# Patient Record
Sex: FEMALE | Race: WHITE | NOT HISPANIC OR LATINO | Employment: FULL TIME | URBAN - METROPOLITAN AREA
[De-identification: names, ages, dates, MRNs, and addresses within clinical notes are randomized per-mention and may not be internally consistent; named-entity substitution may affect disease eponyms.]

---

## 2017-09-26 ENCOUNTER — HOSPITAL ENCOUNTER (OUTPATIENT)
Dept: ONCOLOGY | Facility: HOSPITAL | Age: 27
End: 2017-09-27
Attending: SURGERY | Admitting: SURGERY

## 2017-09-26 LAB
ABS NEUT (OLG): 5.29 X10(3)/MCL (ref 2.1–9.2)
ALBUMIN SERPL-MCNC: 3.6 GM/DL (ref 3.4–5)
ALBUMIN/GLOB SERPL: 1.2 RATIO (ref 1.1–2)
ALP SERPL-CCNC: 51 UNIT/L (ref 38–126)
ALT SERPL-CCNC: 14 UNIT/L (ref 12–78)
AMPHET UR QL SCN: NORMAL
APPEARANCE, UA: CLEAR
APTT PPP: 25.6 SECOND(S) (ref 24.8–36.9)
AST SERPL-CCNC: 11 UNIT/L (ref 15–37)
B-HCG FREE SERPL-ACNC: NORMAL MIU/ML
B-HCG SERPL QL: POSITIVE
BACTERIA SPEC CULT: ABNORMAL /HPF
BARBITURATE SCN PRESENT UR: NORMAL
BASOPHILS # BLD AUTO: 0 X10(3)/MCL (ref 0–0.2)
BASOPHILS NFR BLD AUTO: 0 %
BENZODIAZ UR QL SCN: NORMAL
BILIRUB SERPL-MCNC: 0.6 MG/DL (ref 0.2–1)
BILIRUB UR QL STRIP: NEGATIVE
BILIRUBIN DIRECT+TOT PNL SERPL-MCNC: 0.2 MG/DL (ref 0–0.5)
BILIRUBIN DIRECT+TOT PNL SERPL-MCNC: 0.4 MG/DL (ref 0–0.8)
BUN SERPL-MCNC: 11 MG/DL (ref 7–18)
CALCIUM SERPL-MCNC: 8.8 MG/DL (ref 8.5–10.1)
CANNABINOIDS UR QL SCN: NORMAL
CHLORIDE SERPL-SCNC: 109 MMOL/L (ref 98–107)
CO2 SERPL-SCNC: 20 MMOL/L (ref 21–32)
COCAINE UR QL SCN: NORMAL
COLOR UR: YELLOW
CREAT SERPL-MCNC: 0.61 MG/DL (ref 0.55–1.02)
EOSINOPHIL # BLD AUTO: 0 X10(3)/MCL (ref 0–0.9)
EOSINOPHIL NFR BLD AUTO: 0 %
ERYTHROCYTE [DISTWIDTH] IN BLOOD BY AUTOMATED COUNT: 12.5 % (ref 11.5–17)
ETHANOL SERPL-MCNC: <3 MG/DL (ref 0–3)
GLOBULIN SER-MCNC: 2.9 GM/DL (ref 2.4–3.5)
GLUCOSE (UA): NEGATIVE
GLUCOSE SERPL-MCNC: 88 MG/DL (ref 74–106)
GROUP & RH: NORMAL
HCT VFR BLD AUTO: 34.1 % (ref 37–47)
HGB BLD-MCNC: 12.3 GM/DL (ref 12–16)
HGB UR QL STRIP: ABNORMAL
INR PPP: 1.11 (ref 0–1.27)
KETONES UR QL STRIP: NEGATIVE
LEUKOCYTE ESTERASE UR QL STRIP: NEGATIVE
LYMPHOCYTES # BLD AUTO: 2.4 X10(3)/MCL (ref 0.6–4.6)
LYMPHOCYTES NFR BLD AUTO: 29 %
MAGNESIUM SERPL-MCNC: 1.7 MG/DL (ref 1.8–2.4)
MCH RBC QN AUTO: 33.2 PG (ref 27–31)
MCHC RBC AUTO-ENTMCNC: 36.1 GM/DL (ref 33–36)
MCV RBC AUTO: 91.9 FL (ref 80–94)
MONOCYTES # BLD AUTO: 0.4 X10(3)/MCL (ref 0.1–1.3)
MONOCYTES NFR BLD AUTO: 5 %
NEUTROPHILS # BLD AUTO: 5.29 X10(3)/MCL (ref 1.4–7.9)
NEUTROPHILS NFR BLD AUTO: 65 %
NITRITE UR QL STRIP: NEGATIVE
OPIATES UR QL SCN: NORMAL
PCP UR QL: NORMAL
PH UR STRIP.AUTO: 6 [PH] (ref 5–7.5)
PH UR STRIP: 6 [PH] (ref 5–9)
PHOSPHATE SERPL-MCNC: 3.5 MG/DL (ref 2.5–4.9)
PLATELET # BLD AUTO: 167 X10(3)/MCL (ref 130–400)
PMV BLD AUTO: 9.7 FL (ref 9.4–12.4)
POTASSIUM SERPL-SCNC: 2.8 MMOL/L (ref 3.5–5.1)
PROT SERPL-MCNC: 6.5 GM/DL (ref 6.4–8.2)
PROT UR QL STRIP: NEGATIVE
PROTHROMBIN TIME: 14.1 SECOND(S) (ref 12.2–14.7)
RBC # BLD AUTO: 3.71 X10(6)/MCL (ref 4.2–5.4)
RBC #/AREA URNS HPF: 13 /HPF (ref 0–2)
SODIUM SERPL-SCNC: 142 MMOL/L (ref 136–145)
SP GR FLD REFRACTOMETRY: 1.02 (ref 1–1.03)
SP GR UR STRIP: 1.02 (ref 1–1.03)
SQUAMOUS EPITHELIAL, UA: ABNORMAL
UROBILINOGEN UR STRIP-ACNC: 0.2
WBC # SPEC AUTO: 8.1 X10(3)/MCL (ref 4.5–11.5)
WBC #/AREA URNS HPF: ABNORMAL /[HPF]

## 2017-09-27 LAB
ABS NEUT (OLG): 3.52 X10(3)/MCL (ref 2.1–9.2)
ALBUMIN SERPL-MCNC: 3.2 GM/DL (ref 3.4–5)
ALBUMIN/GLOB SERPL: 1.5 {RATIO}
ALP SERPL-CCNC: 46 UNIT/L (ref 38–126)
ALT SERPL-CCNC: 15 UNIT/L (ref 12–78)
AST SERPL-CCNC: 6 UNIT/L (ref 15–37)
BASOPHILS # BLD AUTO: 0 X10(3)/MCL (ref 0–0.2)
BASOPHILS NFR BLD AUTO: 0 %
BILIRUB SERPL-MCNC: 0.5 MG/DL (ref 0.2–1)
BILIRUBIN DIRECT+TOT PNL SERPL-MCNC: 0.2 MG/DL (ref 0–0.2)
BILIRUBIN DIRECT+TOT PNL SERPL-MCNC: 0.3 MG/DL (ref 0–0.8)
BUN SERPL-MCNC: 8 MG/DL (ref 7–18)
CALCIUM SERPL-MCNC: 8 MG/DL (ref 8.5–10.1)
CHLORIDE SERPL-SCNC: 108 MMOL/L (ref 98–107)
CO2 SERPL-SCNC: 26 MMOL/L (ref 21–32)
CREAT SERPL-MCNC: 0.53 MG/DL (ref 0.55–1.02)
EOSINOPHIL # BLD AUTO: 0.1 X10(3)/MCL (ref 0–0.9)
EOSINOPHIL NFR BLD AUTO: 2 %
ERYTHROCYTE [DISTWIDTH] IN BLOOD BY AUTOMATED COUNT: 12.6 % (ref 11.5–17)
GLOBULIN SER-MCNC: 2.2 GM/DL (ref 2.4–3.5)
GLUCOSE SERPL-MCNC: 90 MG/DL (ref 74–106)
HCT VFR BLD AUTO: 31.2 % (ref 37–47)
HGB BLD-MCNC: 10.7 GM/DL (ref 12–16)
LYMPHOCYTES # BLD AUTO: 2.7 X10(3)/MCL (ref 0.6–4.6)
LYMPHOCYTES NFR BLD AUTO: 40 %
MCH RBC QN AUTO: 31.9 PG (ref 27–31)
MCHC RBC AUTO-ENTMCNC: 34.3 GM/DL (ref 33–36)
MCV RBC AUTO: 93.1 FL (ref 80–94)
MONOCYTES # BLD AUTO: 0.3 X10(3)/MCL (ref 0.1–1.3)
MONOCYTES NFR BLD AUTO: 5 %
NEUTROPHILS # BLD AUTO: 3.52 X10(3)/MCL (ref 2.1–9.2)
NEUTROPHILS NFR BLD AUTO: 53 %
PLATELET # BLD AUTO: 148 X10(3)/MCL (ref 130–400)
PMV BLD AUTO: 9.7 FL (ref 9.4–12.4)
POTASSIUM SERPL-SCNC: 3.3 MMOL/L (ref 3.5–5.1)
PROT SERPL-MCNC: 5.4 GM/DL (ref 6.4–8.2)
RBC # BLD AUTO: 3.35 X10(6)/MCL (ref 4.2–5.4)
SODIUM SERPL-SCNC: 140 MMOL/L (ref 136–145)
WBC # SPEC AUTO: 6.7 X10(3)/MCL (ref 4.5–11.5)

## 2017-09-28 LAB — FINAL CULTURE: NORMAL

## 2020-01-10 ENCOUNTER — HOSPITAL ENCOUNTER (EMERGENCY)
Facility: HOSPITAL | Age: 30
Discharge: HOME | End: 2020-01-10
Attending: FAMILY MEDICINE
Payer: MEDICAID

## 2020-01-10 VITALS
HEIGHT: 62 IN | OXYGEN SATURATION: 100 % | RESPIRATION RATE: 18 BRPM | SYSTOLIC BLOOD PRESSURE: 139 MMHG | HEART RATE: 71 BPM | WEIGHT: 147.81 LBS | DIASTOLIC BLOOD PRESSURE: 68 MMHG | TEMPERATURE: 98 F | BODY MASS INDEX: 27.2 KG/M2

## 2020-01-10 DIAGNOSIS — K04.7 DENTAL INFECTION: Primary | ICD-10-CM

## 2020-01-10 PROCEDURE — 99284 EMERGENCY DEPT VISIT MOD MDM: CPT | Mod: 25

## 2020-01-10 PROCEDURE — 25000003 PHARM REV CODE 250: Performed by: NURSE PRACTITIONER

## 2020-01-10 PROCEDURE — 96372 THER/PROPH/DIAG INJ SC/IM: CPT

## 2020-01-10 PROCEDURE — 63600175 PHARM REV CODE 636 W HCPCS: Performed by: NURSE PRACTITIONER

## 2020-01-10 RX ORDER — IBUPROFEN 800 MG/1
800 TABLET ORAL EVERY 6 HOURS PRN
Qty: 20 TABLET | Refills: 0 | Status: SHIPPED | OUTPATIENT
Start: 2020-01-10 | End: 2021-11-29

## 2020-01-10 RX ORDER — PENICILLIN V POTASSIUM 250 MG/1
500 TABLET, FILM COATED ORAL
Status: DISCONTINUED | OUTPATIENT
Start: 2020-01-10 | End: 2020-01-10

## 2020-01-10 RX ORDER — CLINDAMYCIN HYDROCHLORIDE 300 MG/1
300 CAPSULE ORAL EVERY 8 HOURS
Qty: 21 CAPSULE | Refills: 0 | Status: SHIPPED | OUTPATIENT
Start: 2020-01-10 | End: 2020-01-17

## 2020-01-10 RX ORDER — CLINDAMYCIN HYDROCHLORIDE 150 MG/1
300 CAPSULE ORAL
Status: COMPLETED | OUTPATIENT
Start: 2020-01-10 | End: 2020-01-10

## 2020-01-10 RX ORDER — DEXAMETHASONE SODIUM PHOSPHATE 4 MG/ML
8 INJECTION, SOLUTION INTRA-ARTICULAR; INTRALESIONAL; INTRAMUSCULAR; INTRAVENOUS; SOFT TISSUE
Status: COMPLETED | OUTPATIENT
Start: 2020-01-10 | End: 2020-01-10

## 2020-01-10 RX ORDER — HYDROCODONE BITARTRATE AND ACETAMINOPHEN 10; 325 MG/1; MG/1
1 TABLET ORAL
Status: COMPLETED | OUTPATIENT
Start: 2020-01-10 | End: 2020-01-10

## 2020-01-10 RX ADMIN — DEXAMETHASONE SODIUM PHOSPHATE 8 MG: 4 INJECTION, SOLUTION INTRAMUSCULAR; INTRAVENOUS at 09:01

## 2020-01-10 RX ADMIN — HYDROCODONE BITARTRATE AND ACETAMINOPHEN 1 TABLET: 10; 325 TABLET ORAL at 08:01

## 2020-01-10 RX ADMIN — CLINDAMYCIN HYDROCHLORIDE 300 MG: 150 CAPSULE ORAL at 09:01

## 2020-01-11 NOTE — ED PROVIDER NOTES
SCRIBE #1 NOTE: I, Vanessa Barahona, am scribing for, and in the presence of, Cassandra Ross NP. I have scribed the entire note.       History     Chief Complaint   Patient presents with    Dental Pain     L upper jaw pain, reports she has an abscess and she needs abx      Review of patient's allergies indicates:   Allergen Reactions    Ceftriaxone     Cyclobenzaprine     Haloperidol lactate     Ondansetron hcl     Ondansetron hcl (pf)     Risperidone analogues     Tramadol          History of Present Illness     HPI    1/10/2020, 8:46 PM  History obtained from the patient      History of Present Illness: Dayana Resendez is a 29 y.o. female patient who presents to the Emergency Department for evaluation of L upper jaw pain with an abscess which onset gradually 4 days PTA. Symptoms are constant and moderate in severity. No mitigating or exacerbating factors reported. No associated sxs reported. Patient denies any fever, chills, n/v/d, dizziness, HA and all other sxs at this time. Prior Tx includes Ibuprofen with no relief of sxs. No further complaints or concerns at this time.         Arrival mode: Personal vehicle     PCP: No primary care provider on file.        Past Medical History:  Past Medical History:   Diagnosis Date    Seizures        Past Surgical History:  No past surgical history on file.      Family History:  No family history on file.    Social History:  Social History     Tobacco Use    Smoking status: Not on file   Substance and Sexual Activity    Alcohol use: Not on file    Drug use: Not on file    Sexual activity: Not on file        Review of Systems     Review of Systems   Constitutional: Negative for activity change, appetite change, chills, diaphoresis and fever.   HENT: Positive for dental problem (L upper jaw pain). Negative for congestion, drooling, ear pain, mouth sores, rhinorrhea, sinus pain, sore throat and trouble swallowing.    Eyes: Negative for pain and discharge.    Respiratory: Negative for cough, chest tightness, shortness of breath, wheezing and stridor.    Cardiovascular: Negative for chest pain, palpitations and leg swelling.   Gastrointestinal: Negative for abdominal distention, abdominal pain, blood in stool, constipation, diarrhea, nausea and vomiting.   Genitourinary: Negative for difficulty urinating, dysuria, flank pain, frequency, hematuria and urgency.   Musculoskeletal: Negative for arthralgias, back pain and myalgias.   Skin: Negative for pallor, rash and wound.   Neurological: Negative for dizziness, syncope, weakness, light-headedness, numbness and headaches.   All other systems reviewed and are negative.       Physical Exam     Initial Vitals [01/10/20 2020]   BP Pulse Resp Temp SpO2   139/68 71 18 97.6 °F (36.4 °C) 100 %      MAP       --          Physical Exam  Nursing Notes and Vital Signs Reviewed.  Constitutional: Patient is in no acute distress. Well-developed and well-nourished.  Head: Atraumatic. Normocephalic.  Eyes: PERRL. EOM intact. Conjunctivae are not pale. No scleral icterus.  ENT: Mucous membranes are moist. Oropharynx is clear and symmetric.    Neck: Supple. Full ROM. No lymphadenopathy.  Cardiovascular: Regular rate. Regular rhythm. No murmurs, rubs, or gallops. Distal pulses are 2+ and symmetric.  Pulmonary/Chest: No respiratory distress. Clear to auscultation bilaterally. No wheezing or rales.  Abdominal: Soft and non-distended.  There is no tenderness.  No rebound, guarding, or rigidity. Good bowel sounds.  Genitourinary: No CVA tenderness  Musculoskeletal: Moves all extremities. No obvious deformities. No edema. No calf tenderness.  Skin: Warm and dry.  Neurological:  Alert, awake, and appropriate.  Normal speech.  No acute focal neurological deficits are appreciated.  Psychiatric: Normal affect. Good eye contact. Appropriate in content.  Mouth: No evident facial swelling. Patient handles secretions normally. Positive for dental caries.  ". No palpable fluctuance. No evidence of periodontal or periapical abscess. No trismus. Tooth #16 has mild erythema and swelling to the gum with no drainage.       ED Course   Procedures  ED Vital Signs:  Vitals:    01/10/20 2020   BP: 139/68   Pulse: 71   Resp: 18   Temp: 97.6 °F (36.4 °C)   TempSrc: Oral   SpO2: 100%   Weight: 67 kg (147 lb 13.1 oz)   Height: 5' 2" (1.575 m)       Abnormal Lab Results:  Labs Reviewed - No data to display     All Lab Results:  None.    Imaging Results:  Imaging Results    None                   The Emergency Provider reviewed the vital signs and test results, which are outlined above.     ED Discussion       8:55 PM: Reassessed pt at this time.  Pt states her condition has improved at this time. Discussed with pt all pertinent ED information and results. Discussed pt dx and plan of tx. Gave pt all f/u and return to the ED instructions. All questions and concerns were addressed at this time. Pt expresses understanding of information and instructions, and is comfortable with plan to discharge. Pt is stable for discharge.    I discussed with patient and/or family/caretaker that evaluation in the ED does not suggest any emergent or life threatening medical conditions requiring immediate intervention beyond what was provided in the ED, and I believe patient is safe for discharge.  Regardless, an unremarkable evaluation in the ED does not preclude the development or presence of a serious of life threatening condition. As such, patient was instructed to return immediately for any worsening or change in current symptoms.                   ED Medication(s):  Medications   HYDROcodone-acetaminophen  mg per tablet 1 tablet (1 tablet Oral Given 1/10/20 2059)   dexamethasone injection 8 mg (8 mg Intramuscular Given 1/10/20 2100)   clindamycin capsule 300 mg (300 mg Oral Given 1/10/20 2100)       Discharge Medication List as of 1/10/2020  8:56 PM      START taking these medications    " Details   clindamycin (CLEOCIN) 300 MG capsule Take 1 capsule (300 mg total) by mouth every 8 (eight) hours. for 7 days, Starting Fri 1/10/2020, Until Fri 1/17/2020, Normal      ibuprofen (ADVIL,MOTRIN) 800 MG tablet Take 1 tablet (800 mg total) by mouth every 6 (six) hours as needed for Pain., Starting Fri 1/10/2020, Normal             Follow-up Information     [facility] - [facility]. Call in 1 week.    Why:  Follow up with [facility] or dentist of your choice for futher evaluation of dental pain.  Contact information:  [address] [id number]  [phone]                       Scribe Attestation:   Scribe #1: I performed the above scribed service and the documentation accurately describes the services I performed. I attest to the accuracy of the note.     Attending:   Physician Attestation Statement for Scribe #1: I, Cassandra Ross NP, personally performed the services described in this documentation, as scribed by Vanessa Barahona, in my presence, and it is both accurate and complete.           Clinical Impression       ICD-10-CM ICD-9-CM   1. Dental infection K04.7 522.4       Disposition:   Disposition: Discharged  Condition: Stable         Cassandra Ross NP  01/10/20 1902

## 2021-11-29 ENCOUNTER — INITIAL PRENATAL (OUTPATIENT)
Dept: OBSTETRICS AND GYNECOLOGY | Facility: CLINIC | Age: 31
End: 2021-11-29
Payer: MEDICAID

## 2021-11-29 VITALS
HEART RATE: 93 BPM | DIASTOLIC BLOOD PRESSURE: 61 MMHG | BODY MASS INDEX: 27.62 KG/M2 | WEIGHT: 151 LBS | SYSTOLIC BLOOD PRESSURE: 110 MMHG

## 2021-11-29 DIAGNOSIS — Z87.42 HISTORY OF ABNORMAL CERVICAL PAPANICOLAOU SMEAR: ICD-10-CM

## 2021-11-29 DIAGNOSIS — R56.9 SEIZURES: ICD-10-CM

## 2021-11-29 DIAGNOSIS — F19.11 HISTORY OF DRUG ABUSE IN REMISSION: ICD-10-CM

## 2021-11-29 DIAGNOSIS — O99.340 DEPRESSION AFFECTING PREGNANCY: ICD-10-CM

## 2021-11-29 DIAGNOSIS — Z34.83 PRENATAL CARE, SUBSEQUENT PREGNANCY, THIRD TRIMESTER: ICD-10-CM

## 2021-11-29 DIAGNOSIS — F32.A DEPRESSION AFFECTING PREGNANCY: ICD-10-CM

## 2021-11-29 DIAGNOSIS — Z3A.25 25 WEEKS GESTATION OF PREGNANCY: Primary | ICD-10-CM

## 2021-11-29 LAB
CREATININE RANDOM URINE: 37.4 MG/DL (ref 28–217)
PROT UR QL STRIP: 6.66 MG/DL (ref 5–24)
PROTEIN/CREATININE RATIO: 0.18

## 2021-11-29 PROCEDURE — 99204 OFFICE O/P NEW MOD 45 MIN: CPT | Mod: TH,S$GLB,, | Performed by: NURSE PRACTITIONER

## 2021-11-29 PROCEDURE — 99204 PR OFFICE/OUTPT VISIT, NEW, LEVL IV, 45-59 MIN: ICD-10-PCS | Mod: TH,S$GLB,, | Performed by: NURSE PRACTITIONER

## 2021-11-29 RX ORDER — PNV NO.95/FERROUS FUM/FOLIC AC 28MG-0.8MG
1 TABLET ORAL DAILY
Qty: 30 TABLET | Refills: 11 | Status: SHIPPED | OUTPATIENT
Start: 2021-11-29 | End: 2022-03-02 | Stop reason: SDUPTHER

## 2021-11-29 RX ORDER — PROMETHAZINE HYDROCHLORIDE 25 MG/1
TABLET ORAL
Qty: 20 TABLET | Refills: 2 | Status: SHIPPED | OUTPATIENT
Start: 2021-11-29 | End: 2021-11-29 | Stop reason: SDUPTHER

## 2021-11-29 RX ORDER — PROMETHAZINE HYDROCHLORIDE 25 MG/1
TABLET ORAL
Qty: 20 TABLET | Refills: 2 | Status: SHIPPED | OUTPATIENT
Start: 2021-11-29 | End: 2022-01-07 | Stop reason: SDUPTHER

## 2021-11-29 RX ORDER — LEVETIRACETAM 1000 MG/1
TABLET ORAL
Qty: 60 TABLET | Refills: 11 | Status: SHIPPED | OUTPATIENT
Start: 2021-11-29 | End: 2022-01-07 | Stop reason: SDUPTHER

## 2021-11-29 RX ORDER — LEVETIRACETAM 1000 MG/1
TABLET ORAL
Qty: 60 TABLET | Refills: 11 | Status: SHIPPED | OUTPATIENT
Start: 2021-11-29 | End: 2021-11-29 | Stop reason: SDUPTHER

## 2021-11-29 RX ORDER — BUPRENORPHINE HYDROCHLORIDE 8 MG/1
TABLET SUBLINGUAL
COMMUNITY
Start: 2021-10-28 | End: 2022-01-24

## 2021-11-29 RX ORDER — LEVETIRACETAM 1000 MG/1
TABLET ORAL
COMMUNITY
End: 2021-11-29 | Stop reason: SDUPTHER

## 2021-11-29 RX ORDER — PROMETHAZINE HYDROCHLORIDE 25 MG/1
TABLET ORAL
COMMUNITY
End: 2021-11-29 | Stop reason: SDUPTHER

## 2021-11-30 PROBLEM — G40.909 SEIZURE DISORDER: Status: ACTIVE | Noted: 2020-01-23

## 2021-11-30 PROBLEM — F19.10 SUBSTANCE ABUSE: Status: ACTIVE | Noted: 2020-01-23

## 2021-11-30 LAB
CHLAMYDIA: NEGATIVE
GONORRHEA: NEGATIVE
GROUP B STREP MOLECULAR: NEGATIVE
PENICILLIN ALLERGIC: NO
SOURCE: NORMAL

## 2021-12-01 ENCOUNTER — ROUTINE PRENATAL (OUTPATIENT)
Dept: OBSTETRICS AND GYNECOLOGY | Facility: CLINIC | Age: 31
End: 2021-12-01
Payer: MEDICAID

## 2021-12-01 ENCOUNTER — PROCEDURE VISIT (OUTPATIENT)
Dept: OBSTETRICS AND GYNECOLOGY | Facility: CLINIC | Age: 31
End: 2021-12-01
Payer: MEDICAID

## 2021-12-01 VITALS
SYSTOLIC BLOOD PRESSURE: 109 MMHG | HEART RATE: 98 BPM | BODY MASS INDEX: 28.57 KG/M2 | DIASTOLIC BLOOD PRESSURE: 66 MMHG | WEIGHT: 156.19 LBS

## 2021-12-01 DIAGNOSIS — Z3A.27 27 WEEKS GESTATION OF PREGNANCY: Primary | ICD-10-CM

## 2021-12-01 DIAGNOSIS — Z3A.25 25 WEEKS GESTATION OF PREGNANCY: ICD-10-CM

## 2021-12-01 PROCEDURE — 99213 OFFICE O/P EST LOW 20 MIN: CPT | Mod: TH,S$GLB,, | Performed by: NURSE PRACTITIONER

## 2021-12-01 PROCEDURE — 76805 OB US >/= 14 WKS SNGL FETUS: CPT | Mod: S$GLB,,, | Performed by: STUDENT IN AN ORGANIZED HEALTH CARE EDUCATION/TRAINING PROGRAM

## 2021-12-01 PROCEDURE — 76805 PR US, OB 14+WKS, TRANSABD, SINGLE GESTATION: ICD-10-PCS | Mod: S$GLB,,, | Performed by: STUDENT IN AN ORGANIZED HEALTH CARE EDUCATION/TRAINING PROGRAM

## 2021-12-01 PROCEDURE — 99213 PR OFFICE/OUTPT VISIT, EST, LEVL III, 20-29 MIN: ICD-10-PCS | Mod: TH,S$GLB,, | Performed by: NURSE PRACTITIONER

## 2021-12-09 LAB
APPEARANCE, UA: CLEAR
BARBITURATE SCREEN URINE: NEGATIVE
BENZODIAZ UR QL SCN: NEGATIVE
BENZOYLECGONINE, URINE: NEGATIVE
BILIRUB UR QL STRIP: NEGATIVE MG/DL
CANNABINOID SCREEN URINE: NEGATIVE
CLARITHRO TITR SBT: NEGATIVE {TITER}
COLOR UR: NORMAL
GLUCOSE (UA): NORMAL MG/DL
HGB UR QL STRIP: NEGATIVE /UL
KETONES UR QL STRIP: NEGATIVE MG/DL
LEUKOCYTE ESTERASE UR QL STRIP: NEGATIVE /UL
METHADONE UR QL SCN: NEGATIVE
NITRITE UR QL STRIP: NEGATIVE
OPIATES UR QL SCN: NEGATIVE
PCP UR QL SCN: NEGATIVE
PH UR STRIP: 8 PH (ref 5–9)
PH, URINE (TOX): 8 (ref 5–8)
PROT UR QL STRIP: NEGATIVE MG/DL
SP GR UR STRIP: 1.02 (ref 1–1.03)
SPECIMEN COLLECTION METHOD, URINE: NORMAL
UROBILINOGEN UR STRIP-ACNC: NORMAL MG/DL

## 2022-01-07 ENCOUNTER — ROUTINE PRENATAL (OUTPATIENT)
Dept: OBSTETRICS AND GYNECOLOGY | Facility: CLINIC | Age: 32
End: 2022-01-07
Payer: MEDICAID

## 2022-01-07 VITALS
BODY MASS INDEX: 28.44 KG/M2 | SYSTOLIC BLOOD PRESSURE: 106 MMHG | DIASTOLIC BLOOD PRESSURE: 66 MMHG | HEART RATE: 88 BPM | WEIGHT: 155.5 LBS

## 2022-01-07 DIAGNOSIS — Z3A.35 35 WEEKS GESTATION OF PREGNANCY: ICD-10-CM

## 2022-01-07 DIAGNOSIS — Z86.19 HISTORY OF ELISA POSITIVE FOR HSV: ICD-10-CM

## 2022-01-07 DIAGNOSIS — F19.11 HISTORY OF DRUG ABUSE IN REMISSION: Primary | ICD-10-CM

## 2022-01-07 DIAGNOSIS — O99.340 DEPRESSION AFFECTING PREGNANCY: ICD-10-CM

## 2022-01-07 DIAGNOSIS — F32.A DEPRESSION AFFECTING PREGNANCY: ICD-10-CM

## 2022-01-07 DIAGNOSIS — R56.9 SEIZURES: ICD-10-CM

## 2022-01-07 PROCEDURE — 99213 PR OFFICE/OUTPT VISIT, EST, LEVL III, 20-29 MIN: ICD-10-PCS | Mod: TH,S$GLB,, | Performed by: NURSE PRACTITIONER

## 2022-01-07 PROCEDURE — 99213 OFFICE O/P EST LOW 20 MIN: CPT | Mod: TH,S$GLB,, | Performed by: NURSE PRACTITIONER

## 2022-01-07 RX ORDER — LEVETIRACETAM 1000 MG/1
TABLET ORAL
Qty: 60 TABLET | Refills: 11 | Status: SHIPPED | OUTPATIENT
Start: 2022-01-07 | End: 2022-03-02 | Stop reason: SDUPTHER

## 2022-01-07 RX ORDER — PROMETHAZINE HYDROCHLORIDE 25 MG/1
TABLET ORAL
Qty: 20 TABLET | Refills: 2 | Status: SHIPPED | OUTPATIENT
Start: 2022-01-07 | End: 2022-02-21 | Stop reason: SDUPTHER

## 2022-01-07 NOTE — PROGRESS NOTES
CC: Follow-Up OB    HPI:   31 y.o.  at 33w1d with EDC of 2022, by Ultrasound, here for her follow up OB visit.  Denies any complaints. Dip urine is neg for any illegal drugs. Last seizure was 2 yrs ago and due to xanax withdrawal    Pregnancy ROS:  Positive fetal movement   Negative leakage of fluid   Negative vaginal bleeding   Negative headache, vision changes, RUQ pain, epigastric pain  Negative contractions/abdominal pain   Negative pelvic pressure     Physical Exam:  Prenatal Vitals  BP: 106/66  Weight: 70.5 kg (155 lb 8 oz)  Fundal Height (cm): 34 cm  Fetal Heart Rate: 140  Urine Glucose: Negative  Urine Albumin: Trace    Wt Readings from Last 3 Encounters:   22 70.5 kg (155 lb 8 oz)   21 70.9 kg (156 lb 3.2 oz)   21 68.5 kg (151 lb)       Body mass index is 28.44 kg/m².    General: NAD, well developed, well nourished  Psych: alert and oriented to person, time and place, normal affect  HEENT: normocephalic, atraumatic  Abd: Gravid, soft, NT, ND  Skin: warm, dry  Neuro: normal gait, gross motor function intact    No cyanosis, clubbing or edema    FHTs: 130  FH: 34    Maternal Blood Type: A POS    ASSESSMENT: 31 y.o.  @ 33w1d with   1. History of drug abuse in remission    2. Depression affecting pregnancy    3. Seizures    4. 25 weeks gestation of pregnancy         PLAN:  History of drug abuse in remission  -     Ambulatory referral/consult to Psychiatry; Future; Expected date: 2022    Depression affecting pregnancy  -     Ambulatory referral/consult to Psychiatry; Future; Expected date: 2022    Seizures  -     Ambulatory referral/consult to Psychiatry; Future; Expected date: 2022  -     levETIRAcetam (KEPPRA) 1000 MG tablet; TAKE 1 TABLET BY MOUTH EVERY 12 HOURS  Dispense: 60 tablet; Refill: 11    25 weeks gestation of pregnancy  -     promethazine (PHENERGAN) 25 MG tablet; promethazine 25 mg tablet  Take 1 tablet every 4 hours by oral route for 7 days prn   Dispense: 20 tablet; Refill: 2       Pain, fever, bleeding precautions  Labor, Fetal movement, and Preeclampsia precautions  Cont PNV  Return to clinic in 2 weeks

## 2022-01-24 ENCOUNTER — PROCEDURE VISIT (OUTPATIENT)
Dept: OBSTETRICS AND GYNECOLOGY | Facility: CLINIC | Age: 32
End: 2022-01-24
Payer: MEDICAID

## 2022-01-24 ENCOUNTER — ROUTINE PRENATAL (OUTPATIENT)
Dept: OBSTETRICS AND GYNECOLOGY | Facility: CLINIC | Age: 32
End: 2022-01-24
Payer: MEDICAID

## 2022-01-24 VITALS
SYSTOLIC BLOOD PRESSURE: 117 MMHG | HEART RATE: 86 BPM | BODY MASS INDEX: 28.92 KG/M2 | WEIGHT: 158.13 LBS | DIASTOLIC BLOOD PRESSURE: 77 MMHG

## 2022-01-24 DIAGNOSIS — O36.8131 DECREASED FETAL MOVEMENTS IN THIRD TRIMESTER, FETUS 1 OF MULTIPLE GESTATION: ICD-10-CM

## 2022-01-24 DIAGNOSIS — O36.8130 DECREASED FETAL MOVEMENTS IN THIRD TRIMESTER, SINGLE OR UNSPECIFIED FETUS: Primary | ICD-10-CM

## 2022-01-24 DIAGNOSIS — Z34.83 PRENATAL CARE, SUBSEQUENT PREGNANCY, THIRD TRIMESTER: ICD-10-CM

## 2022-01-24 DIAGNOSIS — Z3A.35 35 WEEKS GESTATION OF PREGNANCY: ICD-10-CM

## 2022-01-24 PROCEDURE — 99214 OFFICE O/P EST MOD 30 MIN: CPT | Mod: TH,S$GLB,, | Performed by: NURSE PRACTITIONER

## 2022-01-24 PROCEDURE — 99214 PR OFFICE/OUTPT VISIT, EST, LEVL IV, 30-39 MIN: ICD-10-PCS | Mod: TH,S$GLB,, | Performed by: NURSE PRACTITIONER

## 2022-01-24 NOTE — PROGRESS NOTES
CC: Follow-Up OB    HPI:   31 y.o.  at 35w4d with EDC of 2022, by Ultrasound, here for her follow up OB visit.  C/O decreased Fetal movements, UDS is neg and has been the entire pregnancy.   PCS times 3 and this will be Repeat CS number 4, pt wants BTL and consents signed last visit.   Dip UDS today is neg.     Pregnancy ROS:  Negative fetal movement    Negative leakage of fluid   Negative vaginal bleeding   Negative headache, vision changes, RUQ pain, epigastric pain  Negative contractions/abdominal pain   Positive pelvic pressure     Physical Exam:  Prenatal Vitals  BP: 117/77  Weight: 71.7 kg (158 lb 2 oz)  Fundal Height (cm): 34 cm  Fetal Heart Rate: 140  Urine Glucose: Negative  Urine Albumin: Negative    Wt Readings from Last 3 Encounters:   22 71.7 kg (158 lb 2 oz)   22 70.5 kg (155 lb 8 oz)   21 70.9 kg (156 lb 3.2 oz)       Body mass index is 28.92 kg/m².    General: NAD, well developed, well nourished  Psych: alert and oriented to person, time and place, normal affect  HEENT: normocephalic, atraumatic  Abd: Gravid, soft, NT, ND  Skin: warm, dry  Neuro: normal gait, gross motor function intact  Cvx closed and thick   No cyanosis, clubbing or edema    FHTs: 140  FH: 34    Maternal Blood Type: A POS    ASSESSMENT: 31 y.o.  @ 35w4d with   1. Decreased fetal movements in third trimester, fetus 1 of multiple gestation         PLAN:  Decreased fetal movements in third trimester, fetus 1 of multiple gestation  -     US OB/GYN Procedure ([facility]); Future     doing well off of the subutex and wants to stay off until after delivery, feels she is doing well off of everything  Pain, fever, bleeding precautions  Labor, Fetal movement, and Preeclampsia precautions  Cont PNV  BPP today for decreased FM   Return to clinic in 1 weeks with Dr. Jensen for evaluation and to set up Csection.

## 2022-01-25 LAB
GROUP B STREP MOLECULAR: POSITIVE
PENICILLIN ALLERGIC: NO

## 2022-01-26 LAB
CHLAMYDIA: NEGATIVE
GONORRHEA: NEGATIVE
SOURCE: NORMAL

## 2022-01-27 ENCOUNTER — PATIENT MESSAGE (OUTPATIENT)
Dept: OBSTETRICS AND GYNECOLOGY | Facility: CLINIC | Age: 32
End: 2022-01-27
Payer: MEDICAID

## 2022-01-31 ENCOUNTER — ROUTINE PRENATAL (OUTPATIENT)
Dept: OBSTETRICS AND GYNECOLOGY | Facility: CLINIC | Age: 32
End: 2022-01-31
Payer: MEDICAID

## 2022-01-31 VITALS
DIASTOLIC BLOOD PRESSURE: 73 MMHG | SYSTOLIC BLOOD PRESSURE: 122 MMHG | WEIGHT: 157 LBS | BODY MASS INDEX: 28.72 KG/M2 | HEART RATE: 99 BPM

## 2022-01-31 DIAGNOSIS — Z98.891 H/O: C-SECTION: ICD-10-CM

## 2022-01-31 DIAGNOSIS — Z3A.36 36 WEEKS GESTATION OF PREGNANCY: ICD-10-CM

## 2022-01-31 DIAGNOSIS — Z34.83 ENCOUNTER FOR SUPERVISION OF OTHER NORMAL PREGNANCY IN THIRD TRIMESTER: Primary | ICD-10-CM

## 2022-01-31 PROBLEM — Z34.93 ENCOUNTER FOR SUPERVISION OF NORMAL PREGNANCY IN THIRD TRIMESTER: Status: ACTIVE | Noted: 2022-01-31

## 2022-01-31 PROCEDURE — 99213 OFFICE O/P EST LOW 20 MIN: CPT | Mod: TH,S$GLB,, | Performed by: STUDENT IN AN ORGANIZED HEALTH CARE EDUCATION/TRAINING PROGRAM

## 2022-01-31 PROCEDURE — 99213 PR OFFICE/OUTPT VISIT, EST, LEVL III, 20-29 MIN: ICD-10-PCS | Mod: TH,S$GLB,, | Performed by: STUDENT IN AN ORGANIZED HEALTH CARE EDUCATION/TRAINING PROGRAM

## 2022-01-31 RX ORDER — VALACYCLOVIR HYDROCHLORIDE 500 MG/1
500 TABLET, FILM COATED ORAL DAILY
Qty: 30 TABLET | Refills: 2 | Status: SHIPPED | OUTPATIENT
Start: 2022-01-31 | End: 2022-04-29 | Stop reason: SDUPTHER

## 2022-01-31 NOTE — PROGRESS NOTES
CC: Follow-Up OB    HPI:   31 y.o.  at 36w4d with EDC of 2022, by 27w Ultrasound, here for her follow up OB visit. Complains of nothing today.    Pregnancy ROS:  Positive fetal movement   Negative leakage of fluid   Negative vaginal bleeding   Negative headache, vision changes, RUQ pain, epigastric pain  Negative contractions/abdominal pain   Negative pelvic pressure     Physical Exam:  Prenatal Vitals  BP: 122/73  Weight: 71.2 kg (157 lb)  Urine Glucose: Negative  Urine Albumin: Negative    Wt Readings from Last 3 Encounters:   22 71.2 kg (157 lb)   22 71.7 kg (158 lb 2 oz)   22 70.5 kg (155 lb 8 oz)     Body mass index is 28.72 kg/m².    General: NAD, well developed, well nourished  Psych: alert and oriented to person, time and place, normal affect  HEENT: normocephalic, atraumatic  Abd: Gravid, soft, NT, ND  Skin: warm, dry  Neuro: normal gait, gross motor function intact  No cyanosis, clubbing or edema    FHTs: 150's    Maternal Blood Type: A POS    ASSESSMENT: 31 y.o.  @ 36w4d with   Patient Active Problem List   Diagnosis    Seizure disorder    Substance abuse    H/O:  x 3    Encounter for supervision of normal pregnancy in third trimester     PLAN:  PNL reviewed   Pt scheduled for C/s on  with BTL  Pain, fever, bleeding precautions  ARMANDO, TAZ, PreE precautions  Cont PNV, Iron  Return to clinic in 1 weeks

## 2022-02-07 ENCOUNTER — ROUTINE PRENATAL (OUTPATIENT)
Dept: OBSTETRICS AND GYNECOLOGY | Facility: CLINIC | Age: 32
End: 2022-02-07
Payer: MEDICAID

## 2022-02-07 VITALS
WEIGHT: 162.25 LBS | HEART RATE: 79 BPM | BODY MASS INDEX: 29.68 KG/M2 | DIASTOLIC BLOOD PRESSURE: 74 MMHG | SYSTOLIC BLOOD PRESSURE: 113 MMHG

## 2022-02-07 DIAGNOSIS — Z98.891 H/O: C-SECTION: Primary | ICD-10-CM

## 2022-02-07 DIAGNOSIS — A49.1 GBS (GROUP B STREPTOCOCCUS) INFECTION: ICD-10-CM

## 2022-02-07 DIAGNOSIS — Z3A.37 37 WEEKS GESTATION OF PREGNANCY: ICD-10-CM

## 2022-02-07 PROCEDURE — 99213 PR OFFICE/OUTPT VISIT, EST, LEVL III, 20-29 MIN: ICD-10-PCS | Mod: TH,S$GLB,, | Performed by: NURSE PRACTITIONER

## 2022-02-07 PROCEDURE — 99213 OFFICE O/P EST LOW 20 MIN: CPT | Mod: TH,S$GLB,, | Performed by: NURSE PRACTITIONER

## 2022-02-07 NOTE — PROGRESS NOTES
CC: Follow-Up OB    HPI:   31 y.o.  at 37w4d with EDC of 2022, by Ultrasound, here for her follow up OB visit.  Denies any complaints.    Pregnancy ROS:  Positive fetal movement   Negative leakage of fluid   Negative vaginal bleeding   Negative headache, vision changes, RUQ pain, epigastric pain  Negative contractions/abdominal pain   Negative pelvic pressure     Physical Exam:  Prenatal Vitals  BP: 113/74  Weight: 73.6 kg (162 lb 4 oz)  Fundal Height (cm): 34 cm  Fetal Heart Rate: 140's  Urine Glucose: Negative  Urine Albumin: Negative    Wt Readings from Last 3 Encounters:   22 73.6 kg (162 lb 4 oz)   22 71.2 kg (157 lb)   22 71.7 kg (158 lb 2 oz)       Body mass index is 29.68 kg/m².    General: NAD, well developed, well nourished  Psych: alert and oriented to person, time and place, normal affect  HEENT: normocephalic, atraumatic  Abd: Gravid, soft, NT, ND  Skin: warm, dry  Neuro: normal gait, gross motor function intact    No cyanosis, clubbing or edema    FHTs: 140  FH: 34    Maternal Blood Type: A POS  GBS +  ASSESSMENT: 31 y.o.  @ 37w4d with   1. H/O:  x 3    2. 37 weeks gestation of pregnancy    3. GBS (group B streptococcus) infection         PLAN:  H/O:  x 3    37 weeks gestation of pregnancy    GBS (group B streptococcus) infection     RCS with BTL is liz for 2022  Pain, fever, bleeding precautions  Labor, Fetal movement, and Preeclampsia precautions  Cont PNV  Return to clinic in 1 weeks

## 2022-02-14 ENCOUNTER — ROUTINE PRENATAL (OUTPATIENT)
Dept: OBSTETRICS AND GYNECOLOGY | Facility: CLINIC | Age: 32
End: 2022-02-14
Payer: MEDICAID

## 2022-02-14 VITALS
HEART RATE: 91 BPM | SYSTOLIC BLOOD PRESSURE: 144 MMHG | BODY MASS INDEX: 29.45 KG/M2 | DIASTOLIC BLOOD PRESSURE: 95 MMHG | WEIGHT: 161 LBS

## 2022-02-14 DIAGNOSIS — Z3A.38 38 WEEKS GESTATION OF PREGNANCY: Primary | ICD-10-CM

## 2022-02-14 DIAGNOSIS — Z34.83 ENCOUNTER FOR SUPERVISION OF OTHER NORMAL PREGNANCY IN THIRD TRIMESTER: ICD-10-CM

## 2022-02-14 DIAGNOSIS — Z98.891 H/O: C-SECTION: ICD-10-CM

## 2022-02-14 DIAGNOSIS — A49.1 GBS (GROUP B STREPTOCOCCUS) INFECTION: ICD-10-CM

## 2022-02-14 DIAGNOSIS — G40.909 SEIZURE DISORDER: ICD-10-CM

## 2022-02-14 LAB
ALT SERPL W P-5'-P-CCNC: 6 U/L (ref 12–78)
AST SERPL-CCNC: 10 U/L (ref 15–37)
BASOPHILS NFR BLD: 0.2 % (ref 0–3)
EOSINOPHIL NFR BLD: 0.4 % (ref 1–3)
ERYTHROCYTE [DISTWIDTH] IN BLOOD BY AUTOMATED COUNT: 15.3 % (ref 12.5–18)
HCT VFR BLD AUTO: 32.1 % (ref 37–47)
HGB BLD-MCNC: 10.7 G/DL (ref 12–16)
LDH SERPL L TO P-CCNC: 143 U/L (ref 82–234)
LYMPHOCYTES NFR BLD: 18.3 % (ref 25–40)
MCH RBC QN AUTO: 28.2 PG (ref 27–31.2)
MCHC RBC AUTO-ENTMCNC: 33.3 G/DL (ref 31.8–35.4)
MCV RBC AUTO: 84.7 FL (ref 80–97)
MONOCYTES NFR BLD: 4.6 % (ref 1–15)
NEUTROPHILS # BLD AUTO: 7.99 10*3/UL (ref 1.8–7.7)
NEUTROPHILS NFR BLD: 75.2 % (ref 37–80)
NUCLEATED RED BLOOD CELLS: 0 %
PLATELETS: 169 10*3/UL (ref 142–424)
RBC # BLD AUTO: 3.79 10*6/UL (ref 4.2–5.4)
URATE SERPL-MCNC: 5 MG/DL (ref 2.6–6)
WBC # BLD: 10.6 10*3/UL (ref 4.6–10.2)

## 2022-02-14 PROCEDURE — 99214 PR OFFICE/OUTPT VISIT, EST, LEVL IV, 30-39 MIN: ICD-10-PCS | Mod: TH,S$GLB,, | Performed by: NURSE PRACTITIONER

## 2022-02-14 PROCEDURE — 99214 OFFICE O/P EST MOD 30 MIN: CPT | Mod: TH,S$GLB,, | Performed by: NURSE PRACTITIONER

## 2022-02-14 NOTE — PROGRESS NOTES
CC: Follow-Up OB    HPI:   31 y.o.  at 38w4d with EDC of 2022, by Ultrasound, here for her follow up OB visit. C/O shortness of breath, headaches and having chest pain. Elevated /95, 152/100. Protein is neg     Pregnancy ROS:  Positive fetal movement but decreased  Negative leakage of fluid   Negative vaginal bleeding   Positive headache, vision changes, RUQ pain, epigastric pain  Positive contractions/abdominal pain   Positive pelvic pressure     Physical Exam:  Prenatal Vitals  BP: (!) 144/95  Weight: 73 kg (161 lb)  Fundal Height (cm): 36 cm  Fetal Heart Rate: 140's  Urine Glucose: Negative  Urine Albumin: Negative    Wt Readings from Last 3 Encounters:   22 73 kg (161 lb)   22 73.6 kg (162 lb 4 oz)   22 71.2 kg (157 lb)       Body mass index is 29.45 kg/m².    General: NAD, well developed, well nourished  Psych: alert and oriented to person, time and place, normal affect  HEENT: normocephalic, atraumatic  Abd: Gravid, soft, NT, ND  Skin: warm, dry  Neuro: normal gait, gross motor function intact    No cyanosis, clubbing or edema    FHTs: 140  FH: 38    Maternal Blood Type: A POS    ASSESSMENT: 31 y.o.  @ 38w4d with   1. 38 weeks gestation of pregnancy    2. H/O:  x 3    3. Seizure disorder    4. GBS (group B streptococcus) infection    5. Encounter for supervision of other normal pregnancy in third trimester         PLAN:  38 weeks gestation of pregnancy    H/O:  x 3    Seizure disorder    GBS (group B streptococcus) infection    Encounter for supervision of other normal pregnancy in third trimester       Pain, fever, bleeding precautions  Labor, Fetal movement, and Preeclampsia precautions  Cont PNV  Return to clinic, to L&D now, spoke with Dr. Jensen and will monitor

## 2022-02-15 ENCOUNTER — OUTSIDE PLACE OF SERVICE (OUTPATIENT)
Dept: OBSTETRICS AND GYNECOLOGY | Facility: CLINIC | Age: 32
End: 2022-02-15
Payer: MEDICAID

## 2022-02-15 ENCOUNTER — PATIENT MESSAGE (OUTPATIENT)
Dept: OBSTETRICS AND GYNECOLOGY | Facility: CLINIC | Age: 32
End: 2022-02-15

## 2022-02-15 LAB
APPEARANCE, UA: CLEAR
BARBITURATE SCREEN URINE: NEGATIVE
BENZODIAZ UR QL SCN: NEGATIVE
BENZOYLECGONINE, URINE: NEGATIVE
BILIRUB UR QL STRIP: NEGATIVE MG/DL
CANNABINOID SCREEN URINE: NEGATIVE
CLARITHRO TITR SBT: NEGATIVE {TITER}
COLOR UR: ABNORMAL
ERYTHROCYTE [DISTWIDTH] IN BLOOD BY AUTOMATED COUNT: 15.6 % (ref 12.5–18)
GLUCOSE (UA): NORMAL MG/DL
HCT VFR BLD AUTO: 31.8 % (ref 37–47)
HGB BLD-MCNC: 10.5 G/DL (ref 12–16)
HGB UR QL STRIP: NEGATIVE /UL
KETONES UR QL STRIP: NEGATIVE MG/DL
LEUKOCYTE ESTERASE UR QL STRIP: NEGATIVE /UL
MCH RBC QN AUTO: 28.3 PG (ref 27–31.2)
MCHC RBC AUTO-ENTMCNC: 33 G/DL (ref 31.8–35.4)
MCV RBC AUTO: 85.7 FL (ref 80–97)
METHADONE UR QL SCN: NEGATIVE
NITRITE UR QL STRIP: NEGATIVE
NUCLEATED RED BLOOD CELLS: 0 %
OPIATES UR QL SCN: NEGATIVE
PCP UR QL SCN: NEGATIVE
PH UR STRIP: 6 PH (ref 5–9)
PH, URINE (TOX): 6 (ref 5–8)
PLATELETS: 166 10*3/UL (ref 142–424)
PROT UR QL STRIP: ABNORMAL MG/DL
RBC # BLD AUTO: 3.71 10*6/UL (ref 4.2–5.4)
RPR: NON REACTIVE
SP GR UR STRIP: 1.02 (ref 1–1.03)
SPECIMEN COLLECTION METHOD, URINE: ABNORMAL
UROBILINOGEN UR STRIP-ACNC: NORMAL MG/DL
WBC # BLD: 11 10*3/UL (ref 4.6–10.2)

## 2022-02-15 PROCEDURE — 59514 PR CESAREAN DELIVERY ONLY: ICD-10-PCS | Mod: AS,AT,, | Performed by: NURSE PRACTITIONER

## 2022-02-15 PROCEDURE — 58611 LIGATE OVIDUCT(S) ADD-ON: CPT | Mod: AS,,, | Performed by: NURSE PRACTITIONER

## 2022-02-15 PROCEDURE — 59514 CESAREAN DELIVERY ONLY: CPT | Mod: AT,ICN,, | Performed by: STUDENT IN AN ORGANIZED HEALTH CARE EDUCATION/TRAINING PROGRAM

## 2022-02-15 PROCEDURE — 59514 CESAREAN DELIVERY ONLY: CPT | Mod: AS,AT,, | Performed by: NURSE PRACTITIONER

## 2022-02-15 PROCEDURE — 58611 PR LIGATION,FALLOPIAN TUBE W/C-SECTION: ICD-10-PCS | Mod: ICN,,, | Performed by: STUDENT IN AN ORGANIZED HEALTH CARE EDUCATION/TRAINING PROGRAM

## 2022-02-15 PROCEDURE — 59514 PR CESAREAN DELIVERY ONLY: ICD-10-PCS | Mod: AT,ICN,, | Performed by: STUDENT IN AN ORGANIZED HEALTH CARE EDUCATION/TRAINING PROGRAM

## 2022-02-15 PROCEDURE — 58611 LIGATE OVIDUCT(S) ADD-ON: CPT | Mod: ICN,,, | Performed by: STUDENT IN AN ORGANIZED HEALTH CARE EDUCATION/TRAINING PROGRAM

## 2022-02-15 PROCEDURE — 58611 PR LIGATION,FALLOPIAN TUBE W/C-SECTION: ICD-10-PCS | Mod: AS,,, | Performed by: NURSE PRACTITIONER

## 2022-02-16 LAB
ABS NRBC COUNT: 0 X 10 3/UL (ref 0–0.01)
ABSOLUTE BASOPHIL: 0.05 X 10 3/UL (ref 0–0.22)
ABSOLUTE EOSINOPHIL: 0.26 X 10 3/UL (ref 0.04–0.54)
ABSOLUTE IMMATURE GRAN: 0.48 X 10 3/UL (ref 0–0.04)
ABSOLUTE LYMPHOCYTE: 2.19 X 10 3/UL (ref 0.86–4.75)
ABSOLUTE MONOCYTE: 0.65 X 10 3/UL (ref 0.22–1.08)
ALBUMIN SERPL-MCNC: 3.8 G/DL (ref 3.5–5.2)
ALBUMIN/GLOB SERPL ELPH: 1.8 {RATIO} (ref 1–2.7)
ALP ISOS SERPL LEV INH-CCNC: 83 U/L (ref 35–105)
ALT (SGPT): 9 U/L (ref 0–33)
AMORPH URATE CRY URNS QL MICRO: ABNORMAL
ANION GAP SERPL CALC-SCNC: 10 MMOL/L (ref 8–17)
ANTIBODY SCREEN: NEGATIVE
AST SERPL-CCNC: 8 U/L (ref 0–32)
BACTERIA #/AREA URNS HPF: ABNORMAL /[HPF]
BASOPHILS NFR BLD: 0.4 % (ref 0.2–1.2)
BILIRUB UR QL STRIP: NEGATIVE
BILIRUBIN, TOTAL: <0.15 MG/DL (ref 0–1.2)
BLOOD GROUPING: NORMAL
BLOOD TYPE (D): POSITIVE
BUN/CREAT SERPL: 17.9 (ref 6–20)
CALCIUM SERPL-MCNC: 8.8 MG/DL (ref 8.6–10.2)
CARBON DIOXIDE, CO2: 23 MMOL/L (ref 22–29)
CHLORIDE: 105 MMOL/L (ref 98–107)
CLARITY UR: CLEAR
COLOR UR: YELLOW
CREAT SERPL-MCNC: 0.48 MG/DL (ref 0.5–0.9)
EOSINOPHIL NFR BLD: 2 % (ref 0.7–7)
EPITHELIAL CELLS: ABNORMAL
ERYTHROCYTE [DISTWIDTH] IN BLOOD BY AUTOMATED COUNT: 15.6 % (ref 12.5–18)
GFR ESTIMATION: 149.88
GLOBULIN: 2.1 G/DL (ref 1.5–4.5)
GLUCOSE (UA): NEGATIVE MG/DL
GLUCOSE 1 HR POST 50 GM: 129 MG/DL
GLUCOSE: 126 MG/DL (ref 74–106)
HBV SURFACE AG SERPL QL IA: NONREACTIVE
HCT VFR BLD AUTO: 27.7 % (ref 37–47)
HCT VFR BLD AUTO: 31.9 % (ref 37–47)
HCV IGG SERPL QL IA: NONREACTIVE
HGB BLD-MCNC: 10.5 G/DL (ref 12–16)
HGB BLD-MCNC: 8.9 G/DL (ref 12–16)
HIV 1+2 AB+HIV1 P24 AG SERPL QL IA: NONREACTIVE
HSV1 IGG SER-ACNC: REACTIVE
HSV2 IGG SER-ACNC: REACTIVE
IMMATURE GRANULOCYTES: 3.7 % (ref 0–0.5)
KETONES UR QL STRIP: NEGATIVE MG/DL
LDH SERPL L TO P-CCNC: 160 U/L (ref 135–214)
LEUKOCYTE ESTERASE UR QL STRIP: NEGATIVE
LYMPHOCYTES NFR BLD: 16.9 % (ref 19.3–53.1)
MCH RBC QN AUTO: 28.2 PG (ref 27–31.2)
MCH RBC QN AUTO: 29.6 PG (ref 27–32)
MCHC RBC AUTO-ENTMCNC: 32.1 G/DL (ref 31.8–35.4)
MCHC RBC AUTO-ENTMCNC: 32.9 G/DL (ref 32–36)
MCV RBC AUTO: 87.7 FL (ref 80–97)
MCV RBC AUTO: 89.9 FL (ref 82–100)
MONOCYTES NFR BLD: 5 % (ref 4.7–12.5)
MUCOUS THREADS URNS QL MICRO: ABNORMAL
NEUTROPHILS # BLD AUTO: 9.34 X 10 3/UL (ref 2.15–7.56)
NEUTROPHILS NFR BLD: 72 % (ref 34–71.1)
NITRITE UR QL STRIP: NEGATIVE
NUCLEATED RED BLOOD CELLS: 0 %
NUCLEATED RED BLOOD CELLS: 0 /100 WBC (ref 0–0.2)
OCCULT BLOOD: NEGATIVE
PH, URINE: 7 (ref 5–7.5)
PLATELET # BLD AUTO: 171 X 10 3/UL (ref 135–400)
PLATELETS: 131 10*3/UL (ref 142–424)
POTASSIUM: 3.8 MMOL/L (ref 3.5–5.1)
PROT SNV-MCNC: 5.9 G/DL (ref 6.4–8.3)
PROT UR QL STRIP: NEGATIVE MG/DL
RBC # BLD AUTO: 3.16 10*6/UL (ref 4.2–5.4)
RBC # BLD AUTO: 3.55 X 10 6/UL (ref 4.2–5.4)
RBC/HPF: NEGATIVE
RDW-SD: 42.9 FL (ref 37–54)
RUBELLA IGG SCREEN: NORMAL
SODIUM: 138 MMOL/L (ref 136–145)
SP GR UR STRIP: 1.01 (ref 1–1.03)
SPECIMEN TO PATHOLOGY: NORMAL
SYPHILIS TREPONEMAL ANTIBODY: NONREACTIVE
T4, FREE: 0.97 NG/DL (ref 0.93–1.7)
TSH SERPL DL<=0.005 MIU/L-ACNC: 0.92 UIU/ML (ref 0.27–4.2)
URATE SERPL-MCNC: 3 MG/DL (ref 2.4–5.7)
UREA NITROGEN (BUN): 8.6 MG/DL (ref 6–20)
UROBILINOGEN, URINE: NORMAL E.U./DL (ref 0–1)
WBC # BLD: 12.97 X 10 3/UL (ref 4.3–10.8)
WBC # BLD: 9 10*3/UL (ref 4.6–10.2)
WBC/HPF: ABNORMAL

## 2022-02-16 PROCEDURE — 99231 PR SUBSEQUENT HOSPITAL CARE,LEVL I: ICD-10-PCS | Mod: ICN,,, | Performed by: STUDENT IN AN ORGANIZED HEALTH CARE EDUCATION/TRAINING PROGRAM

## 2022-02-16 PROCEDURE — 99231 SBSQ HOSP IP/OBS SF/LOW 25: CPT | Mod: ICN,,, | Performed by: STUDENT IN AN ORGANIZED HEALTH CARE EDUCATION/TRAINING PROGRAM

## 2022-02-17 ENCOUNTER — OUTSIDE PLACE OF SERVICE (OUTPATIENT)
Dept: OBSTETRICS AND GYNECOLOGY | Facility: CLINIC | Age: 32
End: 2022-02-17
Payer: MEDICAID

## 2022-02-17 PROCEDURE — 99231 PR SUBSEQUENT HOSPITAL CARE,LEVL I: ICD-10-PCS | Mod: ,,, | Performed by: OBSTETRICS & GYNECOLOGY

## 2022-02-17 PROCEDURE — 99231 SBSQ HOSP IP/OBS SF/LOW 25: CPT | Mod: ,,, | Performed by: OBSTETRICS & GYNECOLOGY

## 2022-02-18 ENCOUNTER — OUTSIDE PLACE OF SERVICE (OUTPATIENT)
Dept: OBSTETRICS AND GYNECOLOGY | Facility: CLINIC | Age: 32
End: 2022-02-18
Payer: MEDICAID

## 2022-02-18 PROCEDURE — 99238 PR HOSPITAL DISCHARGE DAY,<30 MIN: ICD-10-PCS | Mod: ,,, | Performed by: OBSTETRICS & GYNECOLOGY

## 2022-02-18 PROCEDURE — 99238 HOSP IP/OBS DSCHRG MGMT 30/<: CPT | Mod: ,,, | Performed by: OBSTETRICS & GYNECOLOGY

## 2022-02-20 ENCOUNTER — PATIENT MESSAGE (OUTPATIENT)
Dept: OBSTETRICS AND GYNECOLOGY | Facility: CLINIC | Age: 32
End: 2022-02-20
Payer: MEDICAID

## 2022-02-20 DIAGNOSIS — F41.9 ANXIETY: ICD-10-CM

## 2022-02-20 DIAGNOSIS — G40.909 SEIZURE DISORDER: Primary | ICD-10-CM

## 2022-02-21 ENCOUNTER — PATIENT MESSAGE (OUTPATIENT)
Dept: OBSTETRICS AND GYNECOLOGY | Facility: CLINIC | Age: 32
End: 2022-02-21
Payer: MEDICAID

## 2022-02-21 DIAGNOSIS — R52 PAIN: Primary | ICD-10-CM

## 2022-02-21 RX ORDER — IBUPROFEN 800 MG/1
800 TABLET ORAL EVERY 8 HOURS PRN
Qty: 30 TABLET | Refills: 2 | Status: SHIPPED | OUTPATIENT
Start: 2022-02-21 | End: 2022-03-02

## 2022-02-21 RX ORDER — DIAZEPAM 5 MG/1
5 TABLET ORAL EVERY 8 HOURS PRN
Qty: 30 TABLET | Refills: 0 | Status: SHIPPED | OUTPATIENT
Start: 2022-02-21 | End: 2022-03-02

## 2022-03-01 ENCOUNTER — PATIENT MESSAGE (OUTPATIENT)
Dept: OBSTETRICS AND GYNECOLOGY | Facility: CLINIC | Age: 32
End: 2022-03-01
Payer: MEDICAID

## 2022-03-02 ENCOUNTER — OFFICE VISIT (OUTPATIENT)
Dept: OBSTETRICS AND GYNECOLOGY | Facility: CLINIC | Age: 32
End: 2022-03-02
Payer: MEDICAID

## 2022-03-02 VITALS
HEIGHT: 62 IN | DIASTOLIC BLOOD PRESSURE: 87 MMHG | WEIGHT: 149.5 LBS | BODY MASS INDEX: 27.51 KG/M2 | SYSTOLIC BLOOD PRESSURE: 136 MMHG | HEART RATE: 100 BPM

## 2022-03-02 DIAGNOSIS — Z98.890 POST-OPERATIVE STATE: Primary | ICD-10-CM

## 2022-03-02 DIAGNOSIS — R60.1 GENERALIZED EDEMA: ICD-10-CM

## 2022-03-02 DIAGNOSIS — G89.18 POST-OP PAIN: ICD-10-CM

## 2022-03-02 DIAGNOSIS — Z3A.25 25 WEEKS GESTATION OF PREGNANCY: ICD-10-CM

## 2022-03-02 DIAGNOSIS — R56.9 SEIZURES: ICD-10-CM

## 2022-03-02 PROCEDURE — 3075F PR MOST RECENT SYSTOLIC BLOOD PRESS GE 130-139MM HG: ICD-10-PCS | Mod: CPTII,S$GLB,, | Performed by: NURSE PRACTITIONER

## 2022-03-02 PROCEDURE — 1159F PR MEDICATION LIST DOCUMENTED IN MEDICAL RECORD: ICD-10-PCS | Mod: CPTII,S$GLB,, | Performed by: NURSE PRACTITIONER

## 2022-03-02 PROCEDURE — 3075F SYST BP GE 130 - 139MM HG: CPT | Mod: CPTII,S$GLB,, | Performed by: NURSE PRACTITIONER

## 2022-03-02 PROCEDURE — 99024 PR POST-OP FOLLOW-UP VISIT: ICD-10-PCS | Mod: S$GLB,,, | Performed by: NURSE PRACTITIONER

## 2022-03-02 PROCEDURE — 3079F PR MOST RECENT DIASTOLIC BLOOD PRESSURE 80-89 MM HG: ICD-10-PCS | Mod: CPTII,S$GLB,, | Performed by: NURSE PRACTITIONER

## 2022-03-02 PROCEDURE — 3079F DIAST BP 80-89 MM HG: CPT | Mod: CPTII,S$GLB,, | Performed by: NURSE PRACTITIONER

## 2022-03-02 PROCEDURE — 1159F MED LIST DOCD IN RCRD: CPT | Mod: CPTII,S$GLB,, | Performed by: NURSE PRACTITIONER

## 2022-03-02 PROCEDURE — 3008F BODY MASS INDEX DOCD: CPT | Mod: CPTII,S$GLB,, | Performed by: NURSE PRACTITIONER

## 2022-03-02 PROCEDURE — 99024 POSTOP FOLLOW-UP VISIT: CPT | Mod: S$GLB,,, | Performed by: NURSE PRACTITIONER

## 2022-03-02 PROCEDURE — 3008F PR BODY MASS INDEX (BMI) DOCUMENTED: ICD-10-PCS | Mod: CPTII,S$GLB,, | Performed by: NURSE PRACTITIONER

## 2022-03-02 RX ORDER — IBUPROFEN 800 MG/1
800 TABLET ORAL EVERY 8 HOURS PRN
Qty: 60 TABLET | Refills: 2 | Status: SHIPPED | OUTPATIENT
Start: 2022-03-02 | End: 2023-03-02

## 2022-03-02 RX ORDER — LEVETIRACETAM 1000 MG/1
TABLET ORAL
Qty: 60 TABLET | Refills: 11 | Status: SHIPPED | OUTPATIENT
Start: 2022-03-02

## 2022-03-02 RX ORDER — HYDROCHLOROTHIAZIDE 25 MG/1
25 TABLET ORAL DAILY
Qty: 30 TABLET | Refills: 11 | Status: SHIPPED | OUTPATIENT
Start: 2022-03-02 | End: 2023-03-02

## 2022-03-02 RX ORDER — PNV NO.95/FERROUS FUM/FOLIC AC 28MG-0.8MG
1 TABLET ORAL DAILY
Qty: 30 TABLET | Refills: 11 | Status: SHIPPED | OUTPATIENT
Start: 2022-03-02 | End: 2022-03-29 | Stop reason: SDUPTHER

## 2022-03-02 NOTE — PROGRESS NOTES
Subjective:       Patient ID: Dayana Resendez 31 y.o.     Chief Complaint:  No chief complaint on file.      History of Present Illness  Pt here for wound check/staple removal.  Pt doing well with normal postop complaints.        OB History   No data available       Review of Systems  Review of Systems   Constitutional: Negative for activity change, appetite change, chills, diaphoresis and fever.   Eyes: Negative for visual disturbance.   Respiratory: Negative for shortness of breath and wheezing.    Cardiovascular: Negative for chest pain.   Gastrointestinal: Negative for blood in stool, constipation, diarrhea, nausea and vomiting.   Genitourinary: Negative for dysuria, hematuria and menorrhagia.   Neurological: Negative for headaches.   Psychiatric/Behavioral: Negative for depression. The patient is not nervous/anxious.    Breast: Negative for lump, skin changes and tenderness          Objective:    Physical Exam:   Constitutional: She appears well-developed and well-nourished. No distress.    HENT:   Head: Normocephalic and atraumatic.      Cardiovascular: Exam reveals no clubbing and no cyanosis.     Pulmonary/Chest: Effort normal. No respiratory distress.        Abdominal: Soft. She exhibits abdominal incision (clean dry and intact). She exhibits no distension. There is no rebound and no guarding.             Musculoskeletal: Normal range of motion and moves all extremeties.        Skin: Skin is warm and dry. She is not diaphoretic. No cyanosis or erythema. Nails show no clubbing.    Psychiatric: She has a normal mood and affect. Her behavior is normal.          Assessment:     SP csection 1 week  Wound check normal  Stapes removed        Plan:      Keep wound clean and dry  Pain bleeding fever precautions given  rtc for 6 wk exam

## 2022-03-29 ENCOUNTER — PATIENT MESSAGE (OUTPATIENT)
Dept: OBSTETRICS AND GYNECOLOGY | Facility: CLINIC | Age: 32
End: 2022-03-29
Payer: MEDICAID

## 2022-03-29 DIAGNOSIS — F41.9 ANXIETY: Primary | ICD-10-CM

## 2022-03-29 DIAGNOSIS — Z3A.25 25 WEEKS GESTATION OF PREGNANCY: ICD-10-CM

## 2022-03-29 RX ORDER — PNV NO.95/FERROUS FUM/FOLIC AC 28MG-0.8MG
1 TABLET ORAL DAILY
Qty: 30 TABLET | Refills: 11 | OUTPATIENT
Start: 2022-03-29

## 2022-03-29 RX ORDER — PNV NO.95/FERROUS FUM/FOLIC AC 28MG-0.8MG
1 TABLET ORAL DAILY
Qty: 30 TABLET | Refills: 11 | Status: SHIPPED | OUTPATIENT
Start: 2022-03-29

## 2022-03-29 RX ORDER — HYDROXYZINE PAMOATE 50 MG/1
50 CAPSULE ORAL 2 TIMES DAILY
Qty: 60 CAPSULE | Refills: 2 | Status: SHIPPED | OUTPATIENT
Start: 2022-03-29 | End: 2022-04-28
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
## 2023-03-18 ENCOUNTER — HOSPITAL ENCOUNTER (EMERGENCY)
Facility: HOSPITAL | Age: 33
Discharge: HOME | End: 2023-03-18
Attending: EMERGENCY MEDICINE
Payer: MEDICAID

## 2023-03-18 VITALS
DIASTOLIC BLOOD PRESSURE: 80 MMHG | SYSTOLIC BLOOD PRESSURE: 119 MMHG | TEMPERATURE: 98 F | OXYGEN SATURATION: 100 % | HEART RATE: 91 BPM | RESPIRATION RATE: 20 BRPM

## 2023-03-18 DIAGNOSIS — L08.9 SKIN PUSTULE: Primary | ICD-10-CM

## 2023-03-18 PROCEDURE — 99283 EMERGENCY DEPT VISIT LOW MDM: CPT

## 2023-03-18 PROCEDURE — 25000003 PHARM REV CODE 250: Performed by: NURSE PRACTITIONER

## 2023-03-18 RX ORDER — HYDROCODONE BITARTRATE AND ACETAMINOPHEN 5; 325 MG/1; MG/1
1 TABLET ORAL
Status: COMPLETED | OUTPATIENT
Start: 2023-03-18 | End: 2023-03-18

## 2023-03-18 RX ORDER — SULFAMETHOXAZOLE AND TRIMETHOPRIM 800; 160 MG/1; MG/1
1 TABLET ORAL 2 TIMES DAILY
Qty: 14 TABLET | Refills: 0 | Status: SHIPPED | OUTPATIENT
Start: 2023-03-18 | End: 2023-03-25

## 2023-03-18 RX ADMIN — HYDROCODONE BITARTRATE AND ACETAMINOPHEN 1 TABLET: 5; 325 TABLET ORAL at 05:03

## 2023-03-18 NOTE — DISCHARGE INSTRUCTIONS
Warm soaks, warm compresses. DO NOT PICK AT THE PUSTULE.  Take bactrim ds as directed for 7 days. Apply topical antibiotic ointment to area and may cover with bandaid. Ibuprofen and/or tylenol for pain.

## 2023-03-18 NOTE — ED PROVIDER NOTES
Encounter Date: 3/18/2023       History     Chief Complaint   Patient presents with    Abscess     Pt presents with reddened raised area to right lower inner upper leg where it meets the buttocks. Onset x 3 days. States has hx of staph. No drainage noted     33 y/o female presents with 2 day history of possibly an abscess on the right buttock/cleft / upper leg. States has had abscesses before. No fever.     The history is provided by the patient. No  was used.   Abscess   This is a new problem. The current episode started two days ago. The problem occurs occasionally. The problem has been unchanged. Affected Location: right upper thigh into the cleft of buttock.   Review of patient's allergies indicates:   Allergen Reactions    Ceftriaxone     Cyclobenzaprine     Haloperidol lactate     Ondansetron hcl     Ondansetron hcl (pf)     Risperidone analogues     Tramadol      Past Medical History:   Diagnosis Date    Abnormal Pap smear of cervix     Seizures      Past Surgical History:   Procedure Laterality Date     SECTION      TUBAL LIGATION  02/15/2022     Family History   Problem Relation Age of Onset    Seizures Mother      Social History     Tobacco Use    Smoking status: Every Day    Smokeless tobacco: Never   Substance Use Topics    Alcohol use: Not Currently    Drug use: Not Currently     Review of Systems   Skin:         Abscess/papule   All other systems reviewed and are negative.    Physical Exam     Initial Vitals [23 1656]   BP Pulse Resp Temp SpO2   119/80 91 18 98.2 °F (36.8 °C) 100 %      MAP       --         Physical Exam    Nursing note and vitals reviewed.  Constitutional: She appears well-developed and well-nourished.   Cardiovascular:  Regular rhythm.           Pulmonary/Chest: No respiratory distress.     Neurological: She is alert.   Skin: Skin is warm and dry.        Psychiatric: She has a normal mood and affect.       ED Course   Procedures  Labs Reviewed - No  data to display       Imaging Results    None          Medications   HYDROcodone-acetaminophen 5-325 mg per tablet 1 tablet (has no administration in time range)     Medical Decision Making:   Differential Diagnosis:   Includes but not limited to abscess, papule, pustule  ED Management:  31 y/o female with 2 day history of right upper posterior thigh/cleft of buttock pustule with mild erythema surrounding it. No abscess. No induration. No fluctuance. Very small pustule. Encouraged warm compresses / soaks. Will place on bactrim, topical antibiotics. Discussed ibuprofen for pain and she requested something stronger as it is so painful. Afebrile, not toxic.                         Clinical Impression:   Final diagnoses:  [L08.9] Skin pustule (Primary)        ED Disposition Condition    Discharge Stable          ED Prescriptions       Medication Sig Dispense Start Date End Date Auth. Provider    sulfamethoxazole-trimethoprim 800-160mg (BACTRIM DS) 800-160 mg Tab Take 1 tablet by mouth 2 (two) times daily. for 7 days 14 tablet 3/18/2023 3/25/2023 SYDNIE Floyd          Follow-up Information       Follow up With Specialties Details Why Contact Info    primary care provider  Call in 1 week As needed, If symptoms worsen              SYDNIE Floyd  03/18/23 0319